# Patient Record
Sex: MALE | Race: WHITE | Employment: STUDENT | URBAN - METROPOLITAN AREA
[De-identification: names, ages, dates, MRNs, and addresses within clinical notes are randomized per-mention and may not be internally consistent; named-entity substitution may affect disease eponyms.]

---

## 2017-06-12 ENCOUNTER — TRANSFERRED RECORDS (OUTPATIENT)
Dept: HEALTH INFORMATION MANAGEMENT | Facility: CLINIC | Age: 22
End: 2017-06-12

## 2017-08-22 ENCOUNTER — OFFICE VISIT (OUTPATIENT)
Dept: SURGERY | Facility: CLINIC | Age: 22
End: 2017-08-22
Payer: OTHER MISCELLANEOUS

## 2017-08-22 VITALS — BODY MASS INDEX: 25.2 KG/M2 | WEIGHT: 180 LBS | HEIGHT: 71 IN

## 2017-08-22 DIAGNOSIS — M79.644 THUMB PAIN, RIGHT: Primary | ICD-10-CM

## 2017-08-22 PROCEDURE — 99203 OFFICE O/P NEW LOW 30 MIN: CPT | Performed by: PLASTIC SURGERY

## 2017-08-22 NOTE — NURSING NOTE
"Adrien Salas's goals for this visit include: thumb amputation pain  He requests these members of his care team be copied on today's visit information: no    PCP: Tal Boateng    Referring Provider:  No referring provider defined for this encounter.    Chief Complaint   Patient presents with     Consult     thumb amputation pain       Initial There were no vitals taken for this visit. Estimated body mass index is 17.24 kg/(m^2) as calculated from the following:    Height as of 11/1/02: 1.06 m (3' 5.75\").    Weight as of 11/1/02: 19.4 kg (42 lb 12 oz).  Medication Reconciliation: complete    Do you need any medication refills at today's visit? no    Britany Doshi LPN    "

## 2017-08-22 NOTE — MR AVS SNAPSHOT
After Visit Summary   2017    Adrien Salas    MRN: [id number]           Patient Information     Date Of Birth          1995        Visit Information        Provider Department      2017 3:30 PM DEBORAH Crouch MD [facility]        Today's Diagnoses     Thumb pain, right    -  1       Follow-ups after your visit        Follow-up notes from your care team     Return if symptoms worsen or fail to improve.      Who to contact     If you have questions or need follow up information about today's clinic visit or your schedule please contact [facility] directly at [phone].  Normal or non-critical lab and imaging results will be communicated to you by MyChart, letter or phone within 4 business days after the clinic has received the results. If you do not hear from us within 7 days, please contact the clinic through MyChart or phone. If you have a critical or abnormal lab result, we will notify you by phone as soon as possible.  Submit refill requests through Glook[employer] or call your pharmacy and they will forward the refill request to us. Please allow 3 business days for your refill to be completed.          Additional Information About Your Visit        MyChart Information     [employer]looko is an electronic gateway that provides easy, online access to your medical records. With [employer]ooko, you can request a clinic appointment, read your test results, renew a prescription or communicate with your care team.     To sign up for [facility] [employer] visit the website at [email or web address]   You will be asked to enter the access code listed below, as well as some personal information. Please follow the directions to create your username and password.     Your access code is: [email or web address]  Expires: 2017  8:05 AM     Your access code will  in 90 days. If you need help or a new code, please contact your [facility]  "or call [phone] for assistance.        Care EveryWhere ID     This is your Care EveryWhere ID. This could be used by other organizations to access your [facility] medical records  [id number]        Your Vitals Were     Height BMI (Body Mass Index)                5' 11\" 25.1 kg/m2           Blood Pressure from Last 3 Encounters:   01/03/16 137/79   02/15/05 (!) 86/54   12/20/04 98/56    Weight from Last 3 Encounters:   08/22/17 180 lb (81 %)*   01/03/16 165 lb (73 %)*   02/15/05 53 lb 8 oz (55 %)*     * Growth percentiles are based on [facility] 2-20 Years data.              Today, you had the following     No orders found for display       Primary Care Provider Office Phone # Fax #    Tal Boateng [phone]       [facility] [address]   [facility] [address]        Equal Access to Services     [facility] CenterING[facility]ID : Hadii aad ku hadasho Soomaali, waaxda luqadaha, qaybta kaalmada adeegyada, waxay idiin haycamn linda [place] . So [facility] [phone].    ATENCIÓN: Si habla español, tiene a sommer disposición servicios gratuitos de asistencia lingüística. Llame al [phone].    We comply with applicable federal civil rights laws and [place] laws. We do not discriminate on the basis of race, color, national origin, age, disability sex, sexual orientation or gender identity.            Thank you!     Thank you for choosing [facility]  for your care. Our goal is always to provide you with excellent care. Hearing back from our patients is one way we can continue to improve our services. Please take a few minutes to complete the written survey that you may receive in the mail after your visit with us. Thank you!             Your Updated Medication List - Protect others around you: Learn how to safely use, store and throw away your medicines at www.disposemymeds.org.      Notice  As of 8/22/2017 11:59 PM    You have not been prescribed any medications.      "

## 2017-08-23 NOTE — PROGRESS NOTES
REFERRING PHYSICIAN:  Tal Boateng.      PRESENTING COMPLAINT:  Consultation for a painful amputated right thumb.      HISTORY OF PRESENT ILLNESS:  Adrien Salas is 21 years old.  Back in 10/2016 he had a crush injury to his right distal thumb that required operative intervention including K-wiring of the distal tip and repair of the nail bed distal partially amputated tip and the underlying tendons.  Unfortunately it had wound healing complications and ultimately required distal amputation of the thumb just distal to the IP joint.  Patient has completely healed but has been complaining of some nonspecific deep pain at the amputation site and occasional shooting pains who now and again when it taps the wrong way, he is here to have it looked.      PAST MEDICAL HISTORY:  Nil.      PAST SURGICAL HISTORY:  As per HPI.      MEDICATIONS:  Nil.      ALLERGIES:  Nil.      SOCIAL HISTORY:  Does not smoke, works with heavy equipment.      REVIEW OF SYSTEMS:  Denies chest pain, shortness of breath, MI, CVA, DVT and PE.      PHYSICAL EXAMINATION:   VITAL SIGNS:  Stable.  He is afebrile.   GENERAL:  In no obvious distress.   SKIN:  His right thumb he has evidence for a distal tip amputation.  The wound is well-healed.  He has nonspecific pain in the distal tip right in the center the tip of the thumb.  I do not elicit a neuroma like pain in the area of the digital nerves.  Sensation is grossly intact.      ASSESSMENT AND PLAN:  Based above findings, a diagnosis of a nonspecific pain status post amputation of distal portion of the right thumb was made, seems to be more of a healing/bony deep injury like pain but not a neuroma.  Options include doing nothing versus a blind exploration cleanout and may be thickening of the subcutaneous tissues with AlloDerm.  These options were given to the patient, he wants to just conservatively treat it for now.  I will see him back if he changes his mind.  All questions were answered.  He was  happy with the visit.      TOTAL TIME SPENT:  Total time spent with patient 30 minutes, more than half counseling.         DEBORAH DOWLING MD             D: 2017 16:35   T: 2017 05:12   MT: CALVIN#[id number]      Name:     JACKIE BALDWIN   MRN:      [id number]        Account:      [id number]   :      1995           Visit Date:   2017      Document: [id number]       cc: Tal Boateng MD